# Patient Record
Sex: MALE | Race: WHITE | NOT HISPANIC OR LATINO | URBAN - METROPOLITAN AREA
[De-identification: names, ages, dates, MRNs, and addresses within clinical notes are randomized per-mention and may not be internally consistent; named-entity substitution may affect disease eponyms.]

---

## 2017-11-21 ENCOUNTER — EMERGENCY (EMERGENCY)
Facility: HOSPITAL | Age: 27
LOS: 1 days | Discharge: HOME | End: 2017-11-21
Attending: EMERGENCY MEDICINE | Admitting: EMERGENCY MEDICINE
Payer: COMMERCIAL

## 2017-11-21 VITALS
TEMPERATURE: 98 F | WEIGHT: 154.98 LBS | HEART RATE: 80 BPM | SYSTOLIC BLOOD PRESSURE: 126 MMHG | RESPIRATION RATE: 16 BRPM | OXYGEN SATURATION: 97 % | DIASTOLIC BLOOD PRESSURE: 69 MMHG

## 2017-11-21 DIAGNOSIS — M25.511 PAIN IN RIGHT SHOULDER: ICD-10-CM

## 2017-11-21 DIAGNOSIS — G62.9 POLYNEUROPATHY, UNSPECIFIED: ICD-10-CM

## 2017-11-21 PROCEDURE — 73030 X-RAY EXAM OF SHOULDER: CPT

## 2017-11-21 PROCEDURE — 96372 THER/PROPH/DIAG INJ SC/IM: CPT

## 2017-11-21 PROCEDURE — 73030 X-RAY EXAM OF SHOULDER: CPT | Mod: 26,RT

## 2017-11-21 PROCEDURE — 99284 EMERGENCY DEPT VISIT MOD MDM: CPT | Mod: 25

## 2017-11-21 PROCEDURE — 99283 EMERGENCY DEPT VISIT LOW MDM: CPT | Mod: 25

## 2017-11-21 RX ORDER — CYCLOBENZAPRINE HYDROCHLORIDE 10 MG/1
10 TABLET, FILM COATED ORAL ONCE
Qty: 0 | Refills: 0 | Status: COMPLETED | OUTPATIENT
Start: 2017-11-21 | End: 2017-11-21

## 2017-11-21 RX ORDER — KETOROLAC TROMETHAMINE 30 MG/ML
30 SYRINGE (ML) INJECTION ONCE
Qty: 0 | Refills: 0 | Status: DISCONTINUED | OUTPATIENT
Start: 2017-11-21 | End: 2017-11-21

## 2017-11-21 RX ADMIN — CYCLOBENZAPRINE HYDROCHLORIDE 10 MILLIGRAM(S): 10 TABLET, FILM COATED ORAL at 23:16

## 2017-11-21 RX ADMIN — Medication 30 MILLIGRAM(S): at 23:16

## 2017-11-21 NOTE — ED PROVIDER NOTE - OBJECTIVE STATEMENT
28 y/o male with no PMHx is present with right shoulder pain x3 days. Pt states that his pain occurred when he tried to reach for an object with his right hand when he experienced pain in his shoulder. Pt states since then, he has noticed weakness in his right arm. Pt went to an urgent care center who then referred pt to the ED. Pt reports pain is located on the lateral portion of his shoulder and increases with pain with movement. He denies the following: previous injury to his shoulder, numbing/tingling sensation.

## 2017-11-21 NOTE — ED ADULT NURSE NOTE - CHPI ED SYMPTOMS NEG
no difficulty bearing weight/no tingling/no numbness/no deformity/no abrasion/no stiffness/no back pain/no fever/no bruising

## 2017-11-21 NOTE — ED PROVIDER NOTE - MEDICAL DECISION MAKING DETAILS
26 y/o male with shoulder pain. PE: asymmetrical weakness with hand . decrease motor function of radial nerve. Pain with lifting of the shoulder above head, no atrophy noted. Xray - negative. Atraumatic - likely peripheral neuropathy of the right UE. Pt advised to fu with neurology for possible emg studies.

## 2017-11-21 NOTE — ED PROVIDER NOTE - CARE PLAN
Principal Discharge DX:	Peripheral neuropathy  Instructions for follow-up, activity and diet:	Please follow up with Neurology

## 2017-11-21 NOTE — ED PROVIDER NOTE - PHYSICAL EXAMINATION
Radial motor function of the right hand diminished with medial and ulnar nerve motor function intact.

## 2017-11-21 NOTE — ED ADULT TRIAGE NOTE - CHIEF COMPLAINT QUOTE
Patient states 3 days ago he reached for something in the shower and felt pain in his R shoulder since.  Patient states today he was reaching for his coffee and noted his strength in his R arm has decreased.  Patient noted to be weaker in R arm in triage, no weakness in legs, no slurred speech, no facial droop, no drift, no distress noted.  Seen at urgent care and sent in for r/o nerve damage.

## 2017-11-21 NOTE — ED PROVIDER NOTE - ATTENDING CONTRIBUTION TO CARE
28 y/o male with shoulder pain, atraumatic, no associated midline spinal pain or hx of disk disease. PE: asymmetrical weakness with hand . decrease motor function of radial nerve. Pain with lifting of the shoulder above head, no atrophy noted. non tender over entire spine. likely peripheral radiculopathy. given # for neurology for follow up

## 2017-11-21 NOTE — ED ADULT NURSE NOTE - OBJECTIVE STATEMENT
PT came to ED under direction of Urgent Care MD for reduced ROM, pain and weakness to right shoulder starting Saturday and worsened in the last day and several hours.  Pt reports aching pain upon movement while reaching, reduced ROM due to pain.  Right handgrip is slightly weaker than left.  Pt denies trauma or injury.  Positive PMS x4 extremities.  Pt denies all other medical complaints at this time.  Pt is alert and orientedx3.  No swelling, deformity found at right shoulder at this time.